# Patient Record
Sex: FEMALE | Race: WHITE | NOT HISPANIC OR LATINO | Employment: FULL TIME | URBAN - METROPOLITAN AREA
[De-identification: names, ages, dates, MRNs, and addresses within clinical notes are randomized per-mention and may not be internally consistent; named-entity substitution may affect disease eponyms.]

---

## 2020-01-09 ENCOUNTER — APPOINTMENT (OUTPATIENT)
Dept: RADIOLOGY | Facility: CLINIC | Age: 60
End: 2020-01-09
Attending: FAMILY MEDICINE
Payer: COMMERCIAL

## 2020-01-09 ENCOUNTER — OFFICE VISIT (OUTPATIENT)
Dept: URGENT CARE | Facility: CLINIC | Age: 60
End: 2020-01-09
Payer: COMMERCIAL

## 2020-01-09 VITALS
SYSTOLIC BLOOD PRESSURE: 116 MMHG | WEIGHT: 178 LBS | HEIGHT: 67 IN | BODY MASS INDEX: 27.94 KG/M2 | TEMPERATURE: 97.6 F | RESPIRATION RATE: 16 BRPM | DIASTOLIC BLOOD PRESSURE: 88 MMHG | HEART RATE: 71 BPM | OXYGEN SATURATION: 100 %

## 2020-01-09 DIAGNOSIS — S89.92XA LEFT LEG INJURY, INITIAL ENCOUNTER: ICD-10-CM

## 2020-01-09 DIAGNOSIS — S80.02XA CONTUSION OF LEFT KNEE AND LOWER LEG, INITIAL ENCOUNTER: Primary | ICD-10-CM

## 2020-01-09 DIAGNOSIS — S80.01XA CONTUSION OF RIGHT KNEE, INITIAL ENCOUNTER: ICD-10-CM

## 2020-01-09 DIAGNOSIS — S80.12XA CONTUSION OF LEFT KNEE AND LOWER LEG, INITIAL ENCOUNTER: Primary | ICD-10-CM

## 2020-01-09 DIAGNOSIS — S89.91XA RIGHT KNEE INJURY, INITIAL ENCOUNTER: ICD-10-CM

## 2020-01-09 PROCEDURE — 73590 X-RAY EXAM OF LOWER LEG: CPT

## 2020-01-09 PROCEDURE — 99203 OFFICE O/P NEW LOW 30 MIN: CPT | Performed by: FAMILY MEDICINE

## 2020-01-09 PROCEDURE — 73564 X-RAY EXAM KNEE 4 OR MORE: CPT

## 2020-01-09 RX ORDER — IBANDRONATE SODIUM 150 MG/1
TABLET, FILM COATED ORAL
COMMUNITY
Start: 2019-11-04

## 2020-01-09 NOTE — PATIENT INSTRUCTIONS
1  Right knee contusion  2  Left knee contusion w/ hematoma   3  Left tibial contusion     - xrays show no acute abnormalities  - ace wraps applied to both knees for comfort and support  - instructed to rest the legs and elevate them  - apply ice to the injured sites  - may take Tylenol or Motrin as needed for pain   - follow up w/ PCP for re-check in 3-5 days   - if symptoms persist despite treatment, worsen, or any new symptoms present, should be seen in the ER

## 2020-01-14 NOTE — PROGRESS NOTES
[facility]        NAME: Lupe Caceres is a 64 y o  female  : 1960    MRN: [id number]  DATE: 2020   TIME: 4:52 PM    Assessment and Plan   Contusion of left knee and lower leg, initial encounter [S80 02XA, S80 12XA]  1  Contusion of left knee and lower leg, initial encounter  XR knee 4+ vw left injury    XR tibia fibula 2 vw left   2  Contusion of right knee, initial encounter  XR knee 4+ vw right injury         Patient Instructions     Patient Instructions   1  Right knee contusion  2  Left knee contusion w/ hematoma   3  Left tibial contusion     - xrays show no acute abnormalities  - ace wraps applied to both knees for comfort and support  - instructed to rest the legs and elevate them  - apply ice to the injured sites  - may take Tylenol or Motrin as needed for pain   - follow up w/ PCP for re-check in 3-5 days   - if symptoms persist despite treatment, worsen, or any new symptoms present, should be seen in the ER  Follow up with PCP in 3-5 days  Proceed to  ER if symptoms worsen  Chief Complaint     Chief Complaint   Patient presents with    Knee Injury     bilateral knee injury         History of Present Illness       65 yo female presents for injuries to bilateral lower extremities, specifically both knees and the left lower leg  She states she tripped over a metal barrier in a parking lot and fell onto both knees hitting the left lower leg against the metal object  Incident occurred yesterday  She denies any hitting of head or LOC  She is c/o pain of her right knee, pain, swelling, bruising of her left knee, and pain, swelling, and bruising of her left anterior tibial region  She denies any cuts, abrasions, or open wounds  No numbness/tingling or weakness of the legs  She is able to move both knees in full ROM and and walk and bear weight without any difficulty  She denies any neck or low back pain  No hip pain  No foot or ankle pain   She has not attempted any treatment for the symptoms  She denies any other injuries or complaints  Review of Systems   Review of Systems   Constitutional: Negative  Respiratory: Negative  Cardiovascular: Negative  Gastrointestinal: Negative  Musculoskeletal:        As noted in HPI   Skin:        As noted in HPI   Neurological: Negative  Hematological: Negative  Current Medications       Current Outpatient Medications:     ibandronate (BONIVA) 150 MG tablet, , Disp: , Rfl:     Current Allergies     Allergies as of 01/09/2020 - Reviewed 01/09/2020   Allergen Reaction Noted    Erythromycin  01/09/2020            The following portions of the patient's history were reviewed and updated as appropriate: allergies, current medications, past family history, past medical history, past social history, past surgical history and problem list      Past Medical History:   Diagnosis Date    Cancer ([facility] [address] )     skin/breast    Osteopenia        Past Surgical History:   Procedure Laterality Date    BREAST SURGERY      KNEE SURGERY      OOPHORECTOMY      TONSILLECTOMY         Family History   Problem Relation Age of Onset    No Known Problems Mother     No Known Problems Father          Medications have been verified  Objective   /88   Pulse 71   Temp 97 6 °F (36 4 °C)   Resp 16   Ht 5' 7" (1 702 m)   Wt 80 7 kg (178 lb)   SpO2 100%   BMI 27 88 kg/m²        Physical Exam     Physical Exam   Constitutional: She is oriented to person, place, and time  Musculoskeletal:   Right lower extremity: there is mild swelling of the anterior knee, no bruising or erythema  Tenderness to palpation of the anterior knee  Knee w/ full ROM, mild discomfort w/ movement  Skin is appropriately warm and intact  No cuts or open wounds  Sensations intact  Normal gait  Left lower extremity: there is mild to moderate swelling of the anterior knee with moderate to significant bruising present   There is tenderness to palpation of the anterior knee  Knee w/ full ROM, mild discomfort w/ movement  Skin is appropriately warm and intact  No cuts or open wounds  Sensations intact  Normal gait  There is mild swelling and bruising and mild tenderness to palpation of the anterior tibial region  Normal foot and ankle exam     Neurological: She is alert and oriented to person, place, and time  Skin: Skin is warm, dry and intact  Capillary refill takes less than 2 seconds  Bruising noted  No abrasion, no burn and no laceration noted  No erythema  No pallor  Psychiatric: She has a normal mood and affect  Her behavior is normal  Judgment and thought content normal    Nursing note and vitals reviewed

## 2021-03-10 DIAGNOSIS — Z23 ENCOUNTER FOR IMMUNIZATION: ICD-10-CM
